# Patient Record
(demographics unavailable — no encounter records)

---

## 2025-01-18 NOTE — HISTORY OF PRESENT ILLNESS
[Meds] : meds [FreeTextEntry5] : 67 y/o M presents for NP eval today. Pt reports of discomfort for the past 2-3 months. Denies any direct trauma. Motrin as needed.   *** No Reported Medications** [de-identified] : lifting leg up

## 2025-01-18 NOTE — ASSESSMENT
[FreeTextEntry1] : The patient is a 66-year-old male with a 2 to 3-month history of left hip and left knee pain.  He mostly gets the pain with stairs.  Its mostly over the lateral side of the hip.  He has trouble getting in and out of a car.  He has pain putting on his shoes and socks but has no pain with walking or at night.  He is taken anti-inflammatories with no relief.  He has not had any physical therapy or injections.  He also complains of some left knee pain related to his left hip pain.  Examination of the left lower extremity reveals a normal neurovascular exam.  Examination of the left hip reveals equal leg lengths.  He has full painless range of motion of the left hip in all directions.  He has mild tenderness over the greater trochanter.  He has no weakness of hip flexion or abduction.  He has no redness, rashes or visible scars.  Examination of the left knee reveals a full range of motion.  There is mild lateral joint line pain.  There is some crepitation in the patellofemoral joint with normal tracking.  There is no Darcy's sign or instability and no effusion.  There is no redness, rashes or visible scars.  X-rays done in the office today of the left hip 2 views and the AP pelvis show mild degenerative changes with no narrowing in the left hip.  The right hip is normal.  There are no obvious tumors, masses or calcifications seen.  X-rays done in the office today of the left knee 4 views weightbearing show no evidence for arthritis, loose bodies tumors masses or calcification.  Normal study.  The plan at this time is to start him on meloxicam 15 mg once daily with food and start him in a physical therapy program.  I will see him back in the office as needed.

## 2025-01-18 NOTE — HISTORY OF PRESENT ILLNESS
[Meds] : meds [FreeTextEntry5] : 65 y/o M presents for NP eval today. Pt reports of discomfort for the past 2-3 months. Denies any direct trauma. Motrin as needed.   *** No Reported Medications** [de-identified] : lifting leg up